# Patient Record
Sex: FEMALE | Race: WHITE | Employment: PART TIME | URBAN - METROPOLITAN AREA
[De-identification: names, ages, dates, MRNs, and addresses within clinical notes are randomized per-mention and may not be internally consistent; named-entity substitution may affect disease eponyms.]

---

## 2022-09-14 ENCOUNTER — APPOINTMENT (OUTPATIENT)
Dept: CT IMAGING | Age: 57
End: 2022-09-14

## 2022-09-14 ENCOUNTER — HOSPITAL ENCOUNTER (EMERGENCY)
Age: 57
Discharge: LEFT BEFORE DISCHARGE | End: 2022-09-14
Attending: EMERGENCY MEDICINE

## 2022-09-14 ENCOUNTER — HOSPITAL ENCOUNTER (EMERGENCY)
Dept: CT IMAGING | Age: 57
Discharge: HOME | End: 2022-09-17

## 2022-09-14 VITALS
SYSTOLIC BLOOD PRESSURE: 174 MMHG | RESPIRATION RATE: 16 BRPM | HEIGHT: 63 IN | TEMPERATURE: 98.6 F | HEART RATE: 88 BPM | OXYGEN SATURATION: 99 % | WEIGHT: 120 LBS | DIASTOLIC BLOOD PRESSURE: 70 MMHG | BODY MASS INDEX: 21.26 KG/M2

## 2022-09-14 DIAGNOSIS — R42 DIZZINESS: Primary | ICD-10-CM

## 2022-09-14 LAB
ALBUMIN SERPL-MCNC: 4.1 G/DL (ref 3.5–5)
ALBUMIN/GLOB SERPL: 1.1 {RATIO} (ref 1.2–3.5)
ALP SERPL-CCNC: 81 U/L (ref 50–130)
ALT SERPL-CCNC: 34 U/L (ref 12–65)
ANION GAP SERPL CALC-SCNC: 3 MMOL/L (ref 4–13)
AST SERPL-CCNC: 19 U/L (ref 15–37)
BILIRUB SERPL-MCNC: 0.3 MG/DL (ref 0.2–1.1)
BUN SERPL-MCNC: 14 MG/DL (ref 6–23)
CALCIUM SERPL-MCNC: 9.8 MG/DL (ref 8.3–10.4)
CHLORIDE SERPL-SCNC: 109 MMOL/L (ref 101–110)
CO2 SERPL-SCNC: 31 MMOL/L (ref 21–32)
CREAT SERPL-MCNC: 0.78 MG/DL (ref 0.6–1)
ERYTHROCYTE [DISTWIDTH] IN BLOOD BY AUTOMATED COUNT: 13.2 % (ref 11.9–14.6)
GLOBULIN SER CALC-MCNC: 3.6 G/DL (ref 2.3–3.5)
GLUCOSE BLD STRIP.AUTO-MCNC: 111 MG/DL (ref 65–100)
GLUCOSE SERPL-MCNC: 108 MG/DL (ref 65–100)
HCT VFR BLD AUTO: 41.4 % (ref 35.8–46.3)
HGB BLD-MCNC: 13.2 G/DL (ref 11.7–15.4)
INR PPP: 0.8
MCH RBC QN AUTO: 24.6 PG (ref 26.1–32.9)
MCHC RBC AUTO-ENTMCNC: 31.9 G/DL (ref 31.4–35)
MCV RBC AUTO: 77.2 FL (ref 79.6–97.8)
NRBC # BLD: 0 K/UL (ref 0–0.2)
PLATELET # BLD AUTO: 269 K/UL (ref 150–450)
PMV BLD AUTO: 10.6 FL (ref 9.4–12.3)
POTASSIUM SERPL-SCNC: 3.6 MMOL/L (ref 3.5–5.1)
PROT SERPL-MCNC: 7.7 G/DL (ref 6.3–8.2)
PROTHROMBIN TIME: 11.7 SEC (ref 12.6–14.5)
RBC # BLD AUTO: 5.36 M/UL (ref 4.05–5.2)
SERVICE CMNT-IMP: ABNORMAL
SODIUM SERPL-SCNC: 143 MMOL/L (ref 136–145)
TROPONIN I SERPL HS-MCNC: 3.7 PG/ML (ref 0–14)
WBC # BLD AUTO: 5.9 K/UL (ref 4.3–11.1)

## 2022-09-14 PROCEDURE — 82962 GLUCOSE BLOOD TEST: CPT

## 2022-09-14 PROCEDURE — 99284 EMERGENCY DEPT VISIT MOD MDM: CPT

## 2022-09-14 PROCEDURE — 80053 COMPREHEN METABOLIC PANEL: CPT

## 2022-09-14 PROCEDURE — 85610 PROTHROMBIN TIME: CPT

## 2022-09-14 PROCEDURE — 84484 ASSAY OF TROPONIN QUANT: CPT

## 2022-09-14 PROCEDURE — 93005 ELECTROCARDIOGRAM TRACING: CPT | Performed by: PHYSICIAN ASSISTANT

## 2022-09-14 PROCEDURE — 85027 COMPLETE CBC AUTOMATED: CPT

## 2022-09-14 PROCEDURE — 70450 CT HEAD/BRAIN W/O DYE: CPT

## 2022-09-14 RX ORDER — SODIUM CHLORIDE 0.9 % (FLUSH) 0.9 %
10 SYRINGE (ML) INJECTION
Status: DISCONTINUED | OUTPATIENT
Start: 2022-09-14 | End: 2022-09-18 | Stop reason: HOSPADM

## 2022-09-14 RX ORDER — 0.9 % SODIUM CHLORIDE 0.9 %
100 INTRAVENOUS SOLUTION INTRAVENOUS ONCE
Status: DISCONTINUED | OUTPATIENT
Start: 2022-09-14 | End: 2022-09-18 | Stop reason: HOSPADM

## 2022-09-14 ASSESSMENT — ENCOUNTER SYMPTOMS
DIARRHEA: 0
GASTROINTESTINAL NEGATIVE: 1
VOMITING: 0
NAUSEA: 0
ALLERGIC/IMMUNOLOGIC NEGATIVE: 1
VISUAL CHANGE: 0
BLOOD IN STOOL: 0
RESPIRATORY NEGATIVE: 1
SHORTNESS OF BREATH: 0
EYES NEGATIVE: 1

## 2022-09-14 ASSESSMENT — PAIN SCALES - GENERAL: PAINLEVEL_OUTOF10: 0

## 2022-09-14 NOTE — DISCHARGE INSTRUCTIONS
You are refusing the CTA head and neck and MRI. You could be having a stroke and these tests help us determine that. Return immediately if worse in anyway. Follow up with PCP for recheck. We would love to help you get a primary care doctor for follow-up after your emergency department visit. Please call [phone] between 7AM - 6PM Monday to Friday. A care navigator will be able to assist you with setting up a doctor close to your home.

## 2022-09-14 NOTE — ED NOTES
Pt back from CT and placed back on monitors x3. Being evaluated by neurologist at this time.       Daryle Held, RN  09/14/22 2119

## 2022-09-14 NOTE — ED PROVIDER NOTES
Emergency Department Provider Note                   PCP:                Pcp No               Age: 56 y.o. Sex: female       ICD-10-CM    1. Dizziness  R42           DISPOSITION [place] 09/14/2022 07:19:55 PM       MDM     Amount and/or Complexity of Data Reviewed  Clinical lab tests: reviewed  Tests in the radiology section of CPT®: ordered    Risk of Complications, Morbidity, and/or Mortality  Presenting problems: moderate  Diagnostic procedures: moderate  Management options: moderate  General comments: 6:09 PM Spoke with Dr. Grazyna Woods regarding patient. Will call code stroke. Spoke with neurologist regarding patient. He recommended MRI of the brain, patient refused. She states \"I am not going in that tube. \"  She also reviews that she CTA head and neck because she states she does not drive after dark and she believes that she is healed and got healed her. She does not think she is having a stroke. The neurologist and I both recommended these tests and she feels confident that her blood work and CT of her head were negative and she is going to be fine. She is of sound state in mind to make these decisions. She was referred to primary care for follow-up. She states she should have health insurance as she started her job a month and a half ago so she can follow-up. She does not want to take any medications and does not think that she needs any. AMA form was signed. She is ambulatory out of the ED without difficulty at this time. Patient Progress  Patient progress: stable       Orders Placed This Encounter   Procedures    CT HEAD WO CONTRAST    CTA HEAD NECK W CONTRAST    CBC    Comprehensive Metabolic Panel    Protime-INR    Troponin    Diet NPO    Urine dipstick    NIHSS    Nursing swallow assessment    POCT Glucose    EKG 12 Lead        Medications - No data to display    There are no discharge medications for this patient.        Tolu Ivy is a 56 y.o. female who presents to the Emergency Department with chief complaint of    Chief Complaint   Patient presents with    Dizziness      Patient started around 4 PM today with the room spinning. She states it gets worse if she wipes her eyes fast or turns her head. This is never happened before. She does not take any medications nor did she have any medical problems. No visual changes, headache, neck pain, nausea, vomiting, chest pain, shortness of breath, abdominal pain, dizziness, weakness, abdominal pain, trouble with urination or bowel movements, swelling/tingling or weakness to her arms or legs, difficulty speaking or other new symptoms. She did drive herself here. This started approximately 2 hours ago. The history is provided by the patient. Dizziness  Quality:  Room spinning  Severity:  Mild  Onset quality:  Sudden  Duration:  2 hours  Timing:  Constant  Progression:  Unchanged  Chronicity:  New  Context: eye movement and head movement    Context: not when bending over, not with bowel movement, not with ear pain, not with inactivity, not with loss of consciousness, not with medication, not with physical activity, not when standing up and not when urinating    Relieved by:  Nothing  Worsened by:  Nothing  Ineffective treatments:  None tried  Associated symptoms: no blood in stool, no chest pain, no diarrhea, no headaches, no hearing loss, no nausea, no palpitations, no shortness of breath, no syncope, no tinnitus, no vision changes, no vomiting and no weakness      All other systems reviewed and are negative unless otherwise stated in the history of present illness section. Review of Systems   Constitutional: Negative. Negative for fever. HENT: Negative. Negative for hearing loss and tinnitus. Eyes: Negative. Respiratory: Negative. Negative for shortness of breath. Cardiovascular: Negative. Negative for chest pain, palpitations and syncope. Gastrointestinal: Negative.   Negative for blood in stool, diarrhea, nausea and vomiting. Endocrine: Negative. Genitourinary: Negative. Musculoskeletal: Negative. Skin: Negative. Allergic/Immunologic: Negative. Neurological:  Positive for dizziness. Negative for tremors, seizures, syncope, facial asymmetry, speech difficulty, weakness, light-headedness, numbness and headaches. Hematological: Negative. Psychiatric/Behavioral: Negative. All other systems reviewed and are negative. No past medical history on file. No past surgical history on file. No family history on file. Social History     Socioeconomic History    Marital status: Single        Allergies: Patient has no known allergies. There are no discharge medications for this patient. Vitals signs and nursing note reviewed. Patient Vitals for the past 4 hrs:   Temp Pulse Resp BP SpO2   09/14/22 1900 -- 88 -- (!) 174/70 --   09/14/22 1715 98.6 °F (37 °C) 98 16 (!) 162/75 99 %          Physical Exam  Vitals and nursing note reviewed. Constitutional:       Appearance: Normal appearance. HENT:      Head: Normocephalic and atraumatic. Right Ear: External ear normal.      Left Ear: External ear normal.      Nose: Nose normal.      Mouth/Throat:      Mouth: Mucous membranes are moist.   Eyes:      General: No visual field deficit. Extraocular Movements: Extraocular movements intact. Conjunctiva/sclera: Conjunctivae normal.      Pupils: Pupils are equal, round, and reactive to light. Cardiovascular:      Rate and Rhythm: Normal rate. Pulses: Normal pulses. Pulmonary:      Effort: Pulmonary effort is normal.   Abdominal:      General: Abdomen is flat. Palpations: Abdomen is soft. Musculoskeletal:         General: Normal range of motion. Cervical back: Normal range of motion. Skin:     General: Skin is warm. Capillary Refill: Capillary refill takes less than 2 seconds. Neurological:      General: No focal deficit present.       Mental Status: She is alert and oriented to person, place, and time. Mental status is at baseline. GCS: GCS eye subscore is 4. GCS verbal subscore is 5. GCS motor subscore is 6. Cranial Nerves: Cranial nerves are intact. No cranial nerve deficit, dysarthria or facial asymmetry. Sensory: Sensation is intact. No sensory deficit. Motor: Motor function is intact. No weakness. Coordination: Coordination is intact. Romberg sign negative. Coordination normal. Finger-Nose-Finger Test normal.      Gait: Gait is intact. Gait normal.      Deep Tendon Reflexes: Reflexes normal.   Psychiatric:         Mood and Affect: Mood normal.         Behavior: Behavior normal.         Thought Content: Thought content normal.         Judgment: Judgment normal.        Procedures    ED EKG Interpretation  EKG was interpreted in the absence of a cardiologist.    Rate: Rate: Tachycardia  EKG Interpretation: EKG Interpretation: sinus rhythm  ST Segments: Normal ST segments - NO STEMI    Results for orders placed or performed during the hospital encounter of 09/14/22   CT HEAD WO CONTRAST    Narrative    NONCONTRAST HEAD CT    CLINICAL HISTORY:  Sudden onset of dizziness approximately 1 hour prior to  arrival.    TECHNIQUE:  Axial images were obtained with spiral technique. Radiation dose  reduction was achieved using one or all of the following techniques: automated  exposure control, weight-based dosing, iterative reconstruction. COMPARISON:  None. REPORT:   Standard noncontrast head CT demonstrates no definite intracranial  mass effect, hemorrhage, or evidence of acute geographic infarction. The  ventricles are normal in size and configuration, accounting for the patient's  age. Orbits  and paranasal sinuses are clear where imaged. Bone windows  demonstrate no definite fracture or destruction.       Impression    NO ACUTE INTRACRANIAL ABNORMALITY IDENTIFIED AT NONCONTRAST CT.         CBC   Result Value Ref Range    WBC 5.9 4.3 - 11.1 K/uL    RBC 5.36 (H) 4.05 - 5.2 M/uL    Hemoglobin 13.2 11.7 - 15.4 g/dL    Hematocrit 41.4 35.8 - 46.3 %    MCV 77.2 (L) 79.6 - 97.8 FL    MCH 24.6 (L) 26.1 - 32.9 PG    MCHC 31.9 31.4 - 35.0 g/dL    RDW 13.2 11.9 - 14.6 %    Platelets 236 755 - 966 K/uL    MPV 10.6 9.4 - 12.3 FL    nRBC 0.00 0.0 - 0.2 K/uL   Comprehensive Metabolic Panel   Result Value Ref Range    Sodium 143 136 - 145 mmol/L    Potassium 3.6 3.5 - 5.1 mmol/L    Chloride 109 101 - 110 mmol/L    CO2 31 21 - 32 mmol/L    Anion Gap 3 (L) 4 - 13 mmol/L    Glucose 108 (H) 65 - 100 mg/dL    BUN 14 6 - 23 MG/DL    Creatinine 0.78 0.6 - 1.0 MG/DL    GFR African American >60 >60 ml/min/1.73m2    GFR Non- >60 >60 ml/min/1.73m2    Calcium 9.8 8.3 - 10.4 MG/DL    Total Bilirubin 0.3 0.2 - 1.1 MG/DL    ALT 34 12 - 65 U/L    AST 19 15 - 37 U/L    Alk Phosphatase 81 50 - 130 U/L    Total Protein 7.7 6.3 - 8.2 g/dL    Albumin 4.1 3.5 - 5.0 g/dL    Globulin 3.6 (H) 2.3 - 3.5 g/dL    Albumin/Globulin Ratio 1.1 (L) 1.2 - 3.5     Protime-INR   Result Value Ref Range    Protime 11.7 (L) 12.6 - 14.5 sec    INR 0.8     Troponin   Result Value Ref Range    Troponin, High Sensitivity 3.7 0 - 14 pg/mL   POCT Glucose   Result Value Ref Range    POC Glucose 111 (H) 65 - 100 mg/dL    Performed by: Lu Garcia         CT HEAD WO CONTRAST   Final Result   NO ACUTE INTRACRANIAL ABNORMALITY IDENTIFIED AT NONCONTRAST CT.               CTA HEAD NECK W CONTRAST    (Results Pending)        NIH Stroke Scale  Interval: Baseline  Level of Consciousness (1a): Alert  LOC Questions (1b): Answers both correctly  LOC Commands (1c): Performs both tasks correctly  Best Gaze (2): Normal  Visual (3): No visual loss  Facial Palsy (4): Normal symmetrical movement  Motor Arm, Left (5a): No drift  Motor Arm, Right (5b): No drift  Motor Leg, Left (6a): No drift  Motor Leg, Right (6b):  No drift  Limb Ataxia (7): Absent  Sensory (8): Normal  Best Language (9): No aphasia  Dysarthria (10): Normal  Extinction and Inattention (11): No abnormality  Total: 0                   Voice dictation software was used during the making of this note. This software is not perfect and grammatical and other typographical errors may be present. This note has not been completely proofread for errors.      GEETA Downing  09/14/22 1947

## 2022-09-14 NOTE — ED NOTES
I have reviewed discharge instructions with the patient. The patient verbalized understanding. Patient left ED via Discharge Method: ambulatory to Home with self. Opportunity for questions and clarification provided. Patient given 0 scripts. To continue your aftercare when you leave the hospital, you may receive an automated call from our care team to check in on how you are doing. This is a free service and part of our promise to provide the best care and service to meet your aftercare needs.  If you have questions, or wish to unsubscribe from this service please call [phone]. Thank you for Choosing our [address] [place] [address] Emergency Department.         Jose Marcus RN  09/14/22 7536

## 2022-09-14 NOTE — ED TRIAGE NOTES
Patient co sudden feeling of dizziness while on her computer about one hr ago.   Pt is ambulatory to triage, no slurred speech, no facial droop extremity equal in strength

## 2022-09-15 ENCOUNTER — OFFICE VISIT (OUTPATIENT)
Dept: FAMILY MEDICINE CLINIC | Facility: CLINIC | Age: 57
End: 2022-09-15

## 2022-09-15 VITALS
HEIGHT: 65 IN | WEIGHT: 116 LBS | DIASTOLIC BLOOD PRESSURE: 80 MMHG | HEART RATE: 95 BPM | BODY MASS INDEX: 19.33 KG/M2 | OXYGEN SATURATION: 98 % | SYSTOLIC BLOOD PRESSURE: 142 MMHG

## 2022-09-15 DIAGNOSIS — L40.9 PSORIASIS: ICD-10-CM

## 2022-09-15 DIAGNOSIS — H81.11 BENIGN POSITIONAL VERTIGO, RIGHT: Primary | ICD-10-CM

## 2022-09-15 LAB
EKG ATRIAL RATE: 104 BPM
EKG DIAGNOSIS: NORMAL
EKG P AXIS: 74 DEGREES
EKG P-R INTERVAL: 124 MS
EKG Q-T INTERVAL: 346 MS
EKG QRS DURATION: 92 MS
EKG QTC CALCULATION (BAZETT): 454 MS
EKG R AXIS: 72 DEGREES
EKG T AXIS: 49 DEGREES
EKG VENTRICULAR RATE: 104 BPM

## 2022-09-15 PROCEDURE — 99203 OFFICE O/P NEW LOW 30 MIN: CPT | Performed by: FAMILY MEDICINE

## 2022-09-15 ASSESSMENT — PATIENT HEALTH QUESTIONNAIRE - PHQ9
2. FEELING DOWN, DEPRESSED OR HOPELESS: 0
1. LITTLE INTEREST OR PLEASURE IN DOING THINGS: 0
SUM OF ALL RESPONSES TO PHQ QUESTIONS 1-9: 0
SUM OF ALL RESPONSES TO PHQ QUESTIONS 1-9: 0
SUM OF ALL RESPONSES TO PHQ9 QUESTIONS 1 & 2: 0
SUM OF ALL RESPONSES TO PHQ QUESTIONS 1-9: 0
SUM OF ALL RESPONSES TO PHQ QUESTIONS 1-9: 0

## 2022-09-15 ASSESSMENT — ENCOUNTER SYMPTOMS
CHEST TIGHTNESS: 0
ABDOMINAL PAIN: 0
SHORTNESS OF BREATH: 0

## 2022-09-15 NOTE — PROGRESS NOTES
PROGRESS NOTE    SUBJECTIVE:   Jody Long is a 56 y.o. female seen for a follow up visit regarding   Chief Complaint   Patient presents with    New Patient    Follow-up     9/14/2022        HPI:  Patient comes in today after being in the emergency room yesterday where she presented with some dizziness that she describes as vertigo-like but denies true spinning-like sensation. Her work-up there including CT scan of the brain was unremarkable. She denies having associated headache. No other associated neurological symptoms. Today she is well, endorses some mild vertigo-like symptoms provoked with quick head movements. No hearing loss, no tinnitus, no nausea. Emergency room records are reviewed, CT report reviewed, labs reviewed. Reviewed and updated this visit by provider:  Tobacco  Allergies  Meds  Problems  Med Hx  Surg Hx  Fam Hx           Review of Systems   Constitutional:  Negative for fatigue and unexpected weight change. Respiratory:  Negative for chest tightness and shortness of breath. Cardiovascular:  Negative for chest pain and palpitations. Gastrointestinal:  Negative for abdominal pain.    Neurological:         Remarkable only for mild vertigo-like dizziness        OBJECTIVE:  Vitals:    09/15/22 1413   BP: (!) 142/80   Site: Left Upper Arm   Position: Sitting   Cuff Size: Medium Adult   Pulse: 95   SpO2: 98%   Weight: 116 lb (52.6 kg)   Height: 5' 4.5\" (1.638 m)        Physical Exam  General: Alert and oriented x3, well-appearing  HEENT: Normocephalic; external ears, ear canals, and  Tympanic membranes normal; PERRLA, EOMI, normal conjunctiva; normal nasal mucosa without drainage; oropharynx is moist without mucosal lesion  Neck: No adenopathy, thyromegaly or thyroid nodules  Pulmonary: Normal effort, good airflow, no rales or rhonchi  CVS: Regular rate and rhythm, normal S1, S2, no S3 or S4, no murmurs; no carotid bruits, 2+ pedal pulses  Abdomen: Nondistended, normal bowel sounds, nontender without mass organomegaly  Extremities: No cyanosis/clubbing/edema  Neuro: Cranial nerves III through XII grossly intact, no focal motor deficits, reflexes are symmetric. Deerton-Hallpike mildly symptomatic with right ear down, no nystagmus noted  Skin: Scaly white plaque on elbows and knees    Medical problems and test results were reviewed with the patient today.      Recent Results (from the past 672 hour(s))   POCT Glucose    Collection Time: 09/14/22  5:24 PM   Result Value Ref Range    POC Glucose 111 (H) 65 - 100 mg/dL    Performed by: [id number]    CBC    Collection Time: 09/14/22  5:49 PM   Result Value Ref Range    WBC 5.9 4.3 - 11.1 K/uL    RBC 5.36 (H) 4.05 - 5.2 M/uL    Hemoglobin 13.2 11.7 - 15.4 g/dL    Hematocrit 41.4 35.8 - 46.3 %    MCV 77.2 (L) 79.6 - 97.8 FL    MCH 24.6 (L) 26.1 - 32.9 PG    MCHC 31.9 31.4 - 35.0 g/dL    RDW 13.2 11.9 - 14.6 %    Platelets 282 107 - 362 K/uL    MPV 10.6 9.4 - 12.3 FL    nRBC 0.00 0.0 - 0.2 K/uL   Comprehensive Metabolic Panel    Collection Time: 09/14/22  5:49 PM   Result Value Ref Range    Sodium 143 136 - 145 mmol/L    Potassium 3.6 3.5 - 5.1 mmol/L    Chloride 109 101 - 110 mmol/L    CO2 31 21 - 32 mmol/L    Anion Gap 3 (L) 4 - 13 mmol/L    Glucose 108 (H) 65 - 100 mg/dL    BUN 14 6 - 23 MG/DL    Creatinine 0.78 0.6 - 1.0 MG/DL    GFR African American >60 >60 ml/min/1.73m2    GFR Non- >60 >60 ml/min/1.73m2    Calcium 9.8 8.3 - 10.4 MG/DL    Total Bilirubin 0.3 0.2 - 1.1 MG/DL    ALT 34 12 - 65 U/L    AST 19 15 - 37 U/L    Alk Phosphatase 81 50 - 130 U/L    Total Protein 7.7 6.3 - 8.2 g/dL    Albumin 4.1 3.5 - 5.0 g/dL    Globulin 3.6 (H) 2.3 - 3.5 g/dL    Albumin/Globulin Ratio 1.1 (L) 1.2 - 3.5     Protime-INR    Collection Time: 09/14/22  5:49 PM   Result Value Ref Range    Protime 11.7 (L) 12.6 - 14.5 sec    INR 0.8     Troponin    Collection Time: 09/14/22  5:49 PM   Result Value Ref Range    Troponin, High Sensitivity 3.7 0 - 14 pg/mL   EKG 12 Lead    Collection Time: 09/14/22  6:20 PM   Result Value Ref Range    Ventricular Rate 104 BPM    Atrial Rate 104 BPM    P-R Interval 124 ms    QRS Duration 92 ms    Q-T Interval 346 ms    QTc Calculation (Bazett) 454 ms    P Axis 74 degrees    R Axis 72 degrees    T Axis 49 degrees    Diagnosis Sinus tachycardia        No results found for any visits on 09/15/22. ASSESSMENT and PLAN    1. Benign positional vertigo, right   -Discussed Epley maneuver   -Follow-up if persistent or new symptoms  2. Psoriasis       Return if symptoms worsen or fail to improve.        Vinh Parekh MD

## 2022-09-19 DIAGNOSIS — R71.8 MICROCYTOSIS: ICD-10-CM

## 2022-10-05 ENCOUNTER — TELEPHONE (OUTPATIENT)
Dept: FAMILY MEDICINE CLINIC | Facility: CLINIC | Age: 57
End: 2022-10-05

## 2022-10-05 NOTE — TELEPHONE ENCOUNTER
----- Message from Link Becky sent at 10/5/2022  9:00 AM EDT -----  Subject: Appointment Request    Reason for Call: Established Patient Appointment needed: Routine Existing   Condition Follow Up    QUESTIONS    Reason for appointment request? Available appointments did not meet   patient need     Additional Information for Provider? PT is wanting to be seen for BP   check. She's been having left hand pain and her left eye is having muscle   spams (her description). She is willing to see any provider to be seen   today 10/05 preferably. She also needs to know will there be a co-pay   needed at the time of the appt due to her not having insurance.    ---------------------------------------------------------------------------  --------------  Naomi SHEPPARD  [phone]; OK to leave message on voicemail  ---------------------------------------------------------------------------  --------------  SCRIPT ANSWERS  COVID Screen: Asif Obrien

## 2022-10-05 NOTE — TELEPHONE ENCOUNTER
Requesting appointment. No payment needed at time of visit. [FreeTextEntry1] : EKG performed: SR at 77 bpm,  normal axis, no ST/T changes \par \par see lab orders \par \par cont current meds

## 2022-10-06 ENCOUNTER — OFFICE VISIT (OUTPATIENT)
Dept: FAMILY MEDICINE CLINIC | Facility: CLINIC | Age: 57
End: 2022-10-06

## 2022-10-06 VITALS
DIASTOLIC BLOOD PRESSURE: 88 MMHG | OXYGEN SATURATION: 99 % | HEIGHT: 65 IN | BODY MASS INDEX: 19.36 KG/M2 | SYSTOLIC BLOOD PRESSURE: 134 MMHG | WEIGHT: 116.2 LBS | HEART RATE: 123 BPM

## 2022-10-06 DIAGNOSIS — M79.642 LEFT HAND PAIN: ICD-10-CM

## 2022-10-06 DIAGNOSIS — E61.1 IRON DEFICIENCY: Primary | ICD-10-CM

## 2022-10-06 PROCEDURE — 99213 OFFICE O/P EST LOW 20 MIN: CPT | Performed by: FAMILY MEDICINE

## 2022-10-06 RX ORDER — IRON,CARBONYL/ASCORBIC ACID 100-250 MG
TABLET ORAL
COMMUNITY
End: 2022-10-06 | Stop reason: CLARIF

## 2022-10-06 SDOH — ECONOMIC STABILITY: FOOD INSECURITY: WITHIN THE PAST 12 MONTHS, YOU WORRIED THAT YOUR FOOD WOULD RUN OUT BEFORE YOU GOT MONEY TO BUY MORE.: NEVER TRUE

## 2022-10-06 SDOH — ECONOMIC STABILITY: FOOD INSECURITY: WITHIN THE PAST 12 MONTHS, THE FOOD YOU BOUGHT JUST DIDN'T LAST AND YOU DIDN'T HAVE MONEY TO GET MORE.: NEVER TRUE

## 2022-10-06 ASSESSMENT — PATIENT HEALTH QUESTIONNAIRE - PHQ9
1. LITTLE INTEREST OR PLEASURE IN DOING THINGS: 0
DEPRESSION UNABLE TO ASSESS: PT REFUSES
2. FEELING DOWN, DEPRESSED OR HOPELESS: 2
SUM OF ALL RESPONSES TO PHQ QUESTIONS 1-9: 2
SUM OF ALL RESPONSES TO PHQ9 QUESTIONS 1 & 2: 2
SUM OF ALL RESPONSES TO PHQ QUESTIONS 1-9: 2

## 2022-10-06 ASSESSMENT — SOCIAL DETERMINANTS OF HEALTH (SDOH): HOW HARD IS IT FOR YOU TO PAY FOR THE VERY BASICS LIKE FOOD, HOUSING, MEDICAL CARE, AND HEATING?: NOT HARD AT ALL

## 2022-10-06 ASSESSMENT — ENCOUNTER SYMPTOMS
SHORTNESS OF BREATH: 0
CHEST TIGHTNESS: 0

## 2022-10-06 NOTE — PROGRESS NOTES
PROGRESS NOTE    SUBJECTIVE:   Katharine Torres is a 56 y.o. female seen for a follow up visit regarding   Chief Complaint   Patient presents with    Hand Pain     Reports left hand pain x 2 weeks. Spasms     Reports eye twitching in both eyes x 2 weeks. No twitching today. HPI:  Today concerned about some left hand and wrist pain it comes and goes. No clear provocative factors. Episodes are usually brief. They can occur at rest.  She is right-handed. There has been no recent trauma. She has difficulty describing the character of the pain, is not a numbness or tingling or burning pain. Versus being under a lot of stress, generally anxious. Hand Pain   Pertinent negatives include no chest pain. Reviewed and updated this visit by provider:  Tobacco  Allergies  Meds  Problems  Med Hx  Surg Hx  Fam Hx           Review of Systems   Respiratory:  Negative for chest tightness and shortness of breath. Cardiovascular:  Negative for chest pain. OBJECTIVE:  Vitals:    10/06/22 1116 10/06/22 1128   BP: (!) 148/92 134/88   Site: Left Upper Arm Left Upper Arm   Cuff Size: Small Adult Small Adult   Pulse: (!) 123    SpO2: 99%    Weight: 116 lb 3.2 oz (52.7 kg)    Height: 5' 4.5\" (1.638 m)         Physical Exam   General: Alert and oriented x3, well-appearing generally anxious  Musculoskeletal: No focal motor weakness in the left upper extremity. There are no palpable bony abnormalities. She has a negative Tinel's, negative Phalen's, no sensory loss to the palmar or dorsal aspect of the hand    Medical problems and test results were reviewed with the patient today.      Recent Results (from the past 672 hour(s))   POCT Glucose    Collection Time: 09/14/22  5:24 PM   Result Value Ref Range    POC Glucose 111 (H) 65 - 100 mg/dL    Performed by: [id number]    CBC    Collection Time: 09/14/22  5:49 PM   Result Value Ref Range    WBC 5.9 4.3 - 11.1 K/uL    RBC 5.36 (H) 4.05 - 5.2 M/uL Hemoglobin 13.2 11.7 - 15.4 g/dL    Hematocrit 41.4 35.8 - 46.3 %    MCV 77.2 (L) 79.6 - 97.8 FL    MCH 24.6 (L) 26.1 - 32.9 PG    MCHC 31.9 31.4 - 35.0 g/dL    RDW 13.2 11.9 - 14.6 %    Platelets 010 169 - 701 K/uL    MPV 10.6 9.4 - 12.3 FL    nRBC 0.00 0.0 - 0.2 K/uL   Comprehensive Metabolic Panel    Collection Time: 09/14/22  5:49 PM   Result Value Ref Range    Sodium 143 136 - 145 mmol/L    Potassium 3.6 3.5 - 5.1 mmol/L    Chloride 109 101 - 110 mmol/L    CO2 31 21 - 32 mmol/L    Anion Gap 3 (L) 4 - 13 mmol/L    Glucose 108 (H) 65 - 100 mg/dL    BUN 14 6 - 23 MG/DL    Creatinine 0.78 0.6 - 1.0 MG/DL    GFR African American >60 >60 ml/min/1.73m2    GFR Non- >60 >60 ml/min/1.73m2    Calcium 9.8 8.3 - 10.4 MG/DL    Total Bilirubin 0.3 0.2 - 1.1 MG/DL    ALT 34 12 - 65 U/L    AST 19 15 - 37 U/L    Alk Phosphatase 81 50 - 130 U/L    Total Protein 7.7 6.3 - 8.2 g/dL    Albumin 4.1 3.5 - 5.0 g/dL    Globulin 3.6 (H) 2.3 - 3.5 g/dL    Albumin/Globulin Ratio 1.1 (L) 1.2 - 3.5     Protime-INR    Collection Time: 09/14/22  5:49 PM   Result Value Ref Range    Protime 11.7 (L) 12.6 - 14.5 sec    INR 0.8     Troponin    Collection Time: 09/14/22  5:49 PM   Result Value Ref Range    Troponin, High Sensitivity 3.7 0 - 14 pg/mL   EKG 12 Lead    Collection Time: 09/14/22  6:20 PM   Result Value Ref Range    Ventricular Rate 104 BPM    Atrial Rate 104 BPM    P-R Interval 124 ms    QRS Duration 92 ms    Q-T Interval 346 ms    QTc Calculation (Bazett) 454 ms    P Axis 74 degrees    R Axis 72 degrees    T Axis 49 degrees    Diagnosis Sinus tachycardia        No results found for any visits on 10/06/22. ASSESSMENT and PLAN    1. Iron deficiency  -     Iron; Future  2. Left hand pain, suspect stress related, possibly some underlying mild tendinitis. Will follow clinically, return in 1 month iron level         Return if symptoms worsen or fail to improve.        Veronica Carpenter MD

## 2022-12-09 ENCOUNTER — NURSE ONLY (OUTPATIENT)
Dept: FAMILY MEDICINE CLINIC | Facility: CLINIC | Age: 57
End: 2022-12-09

## 2022-12-09 DIAGNOSIS — E61.1 IRON DEFICIENCY: ICD-10-CM

## 2022-12-09 DIAGNOSIS — R71.8 MICROCYTOSIS: ICD-10-CM

## 2022-12-09 LAB
BASOPHILS # BLD: 0.1 K/UL (ref 0–0.2)
BASOPHILS NFR BLD: 1 % (ref 0–2)
DIFFERENTIAL METHOD BLD: ABNORMAL
EOSINOPHIL # BLD: 0.1 K/UL (ref 0–0.8)
EOSINOPHIL NFR BLD: 1 % (ref 0.5–7.8)
ERYTHROCYTE [DISTWIDTH] IN BLOOD BY AUTOMATED COUNT: 13.1 % (ref 11.9–14.6)
HCT VFR BLD AUTO: 41.9 % (ref 35.8–46.3)
HGB BLD-MCNC: 13.3 G/DL (ref 11.7–15.4)
IMM GRANULOCYTES # BLD AUTO: 0 K/UL (ref 0–0.5)
IMM GRANULOCYTES NFR BLD AUTO: 0 % (ref 0–5)
IRON SERPL-MCNC: 83 UG/DL (ref 35–150)
LYMPHOCYTES # BLD: 1.6 K/UL (ref 0.5–4.6)
LYMPHOCYTES NFR BLD: 27 % (ref 13–44)
MCH RBC QN AUTO: 25 PG (ref 26.1–32.9)
MCHC RBC AUTO-ENTMCNC: 31.7 G/DL (ref 31.4–35)
MCV RBC AUTO: 78.8 FL (ref 82–102)
MONOCYTES # BLD: 0.4 K/UL (ref 0.1–1.3)
MONOCYTES NFR BLD: 8 % (ref 4–12)
NEUTS SEG # BLD: 3.6 K/UL (ref 1.7–8.2)
NEUTS SEG NFR BLD: 63 % (ref 43–78)
NRBC # BLD: 0 K/UL (ref 0–0.2)
PLATELET # BLD AUTO: 291 K/UL (ref 150–450)
PMV BLD AUTO: 11 FL (ref 9.4–12.3)
RBC # BLD AUTO: 5.32 M/UL (ref 4.05–5.2)
TIBC SERPL-MCNC: 360 UG/DL (ref 250–450)
WBC # BLD AUTO: 5.7 K/UL (ref 4.3–11.1)